# Patient Record
Sex: MALE | Race: BLACK OR AFRICAN AMERICAN | NOT HISPANIC OR LATINO | URBAN - METROPOLITAN AREA
[De-identification: names, ages, dates, MRNs, and addresses within clinical notes are randomized per-mention and may not be internally consistent; named-entity substitution may affect disease eponyms.]

---

## 2022-08-23 ENCOUNTER — OFFICE VISIT (OUTPATIENT)
Dept: NEUROLOGY | Facility: CLINIC | Age: 27
End: 2022-08-23

## 2022-08-23 VITALS
DIASTOLIC BLOOD PRESSURE: 65 MMHG | HEIGHT: 74 IN | BODY MASS INDEX: 29.65 KG/M2 | HEART RATE: 53 BPM | OXYGEN SATURATION: 97 % | WEIGHT: 231 LBS | SYSTOLIC BLOOD PRESSURE: 108 MMHG

## 2022-08-23 DIAGNOSIS — S06.0X0A CONCUSSION WITHOUT LOSS OF CONSCIOUSNESS, INITIAL ENCOUNTER: Primary | ICD-10-CM

## 2022-08-23 PROCEDURE — 99203 OFFICE O/P NEW LOW 30 MIN: CPT | Performed by: STUDENT IN AN ORGANIZED HEALTH CARE EDUCATION/TRAINING PROGRAM

## 2022-08-23 NOTE — PATIENT INSTRUCTIONS
I would classify this as a concussion without loss of consciousness   I would recommend brain rest, more sleep, dark quiet room if needed during the day.  Gradual return to activity per protocol---ImPACT testing, neuropsychological testing.   The biggest barrier for you will be head/eye movement provoking symptoms (nausea or dizziness).  Listen to your body, a little more time could prevent recurrence and let you not have symptoms throughout the season.    Myself or my colleagues will re evaluate you after the above testing.

## 2022-08-23 NOTE — NURSING NOTE
"Zeb Rodriguez is a 27 year old male who presents for:  Chief Complaint   Patient presents with     Consult     Concussion evaluation        Initial Vitals:  /65   Pulse 53   Ht 1.88 m (6' 2\")   Wt 104.8 kg (231 lb)   SpO2 97%   BMI 29.66 kg/m   Estimated body mass index is 29.66 kg/m  as calculated from the following:    Height as of this encounter: 1.88 m (6' 2\").    Weight as of this encounter: 104.8 kg (231 lb).. Body surface area is 2.34 meters squared. BP completed using cuff size: sasha Glass    "

## 2022-08-23 NOTE — PROGRESS NOTES
[facility]/[place]  Section of General Neurology  New Patient Visit      Zeb Rodriguez MRN# [id number]   Age: 27 year old YOB: 1995     Requesting physician: No ref. provider found  No primary care provider on file.     Reason for Consultation: Post concussive symptoms           Assessment and Plan:     Zeb Rodriguez is a very pleasant 27 year old man who presents in evaluation.  He had a few instances in a recent preseason game where he felt he took and/or delivered some particularly hard hits.  Particularly after he sacked the quarterback did he note some brief blurred vision and dizziness.  He has had some continued non specific neurological issues subsequently.  He is improving and has less headaches today and no visual issues but intermittent nausea and dizziness remain limiting.    Overall we discussed this does meet criteria for concussion without loss of consciousness and should be treated as such in my opinion.        Recommended prioritizing hydration, sleep, may need more time in dark/quiet room/brain rest to facilitate healing.   He had a similar concussion in college and he felt it took him about 1 week to recover.  We discussed it is difficult to ascertain exact timing but similar timing would be a reasonable assumption given the symptoms he is experiencing and interval improvement currently.   Biggest limitation could prove to be head movement provoking dizziness going forward.    Neurological examination was intact though rapid eye movements did provoke this dizziness.   Discussed we can consider anti emetic medications like zofran or headache medications like gabapentin but ultimately time and rest would be best and he would prefer no medications which is quite reasonable.      Overall recommended he follow the [employer] protocol in this regard and after ImPACT testing and Neuropsychological testing myself or one of my colleagues can review this data regarding timing of  "resuming normal football activities.           Harry Ochoa MD   of Neurology   [facility]      History of Presenting Symptoms:   Zeb Rodriguez is a 27 year old male who presents today for evaluation of post concussion symptoms    Nature of injury:  Had a QB sack and had neck issues prior to that too.    Busted his chin open too.   Being a passenger later he felt nauseous, dizzy.   Fell asleep fine.   Stiff neck and treatment.  MRI was clear.    Nausea from lift the next day.   Head movement still made him a little nauseous.    Feeling a little better today.   Had a concussion in college.  Similar.     LOC: none  Current symptoms:  Headache --minimal  Dizziness-- remains limiting  Sensitivity to light/sound--to light at times  ImPACT testing --pending  Neuropsych testing--pending    He does not feel like 100% just yet.  Still has some brain fog.      Common return to play sequence discussed:  Light aerobic exercise (stationary bike)  Sport specific exercise (movement/balance but not dedicated drills)  Non contact drills   Contact drills --after medical clearance  Full return to play    He will follow the [facility] protocols.          Allergies:   No Known Allergies     Review of Systems:   As noted above     Physical Exam:   Vitals: /65   Pulse 53   Ht 1.88 m (6' 2\")   Wt 104.8 kg (231 lb)   SpO2 97%   BMI 29.66 kg/m       Neuro:   General Appearance: No apparent distress, well-nourished, well-groomed, pleasant     Mental Status: Alert and oriented to person, place, and time. Speech fluent and comprehension intact. No dysarthria.    Cranial Nerves:   II: Visual fields: normal  III: Pupils: 3 mm, equal, round, reactive to light   III,IV,VI: Extraocular Movements: intact --rapid eye movement/head movement side to side still provokes dizziness easily.    V: Facial sensation: intact to light touch  VII: Facial strength: intact without asymmetry  VIII: Hearing: intact " grossly  IX: Palate: intact   XI: Shoulder shrug: intact  XII: Tongue movement: normal     Motor Exam:   Upper Extremities  Deltoid  Bicep  Tricep  Wrist Extensors  strength Intrinsic Muscles    Right  5  5  5  5 5 5    Left  5  5  5  5 5 5      Lower Extremities  Hip Flexors  Knee Extensors  Knee   Flexors  Dorsi Flexion  Plantar   Flexion    Right  5  5  5  5  5    Left  5  5  5  5  5        No drift is present. No abnormal movements. Tone is normal throughout.    Sensory: intact to light touch, vibration    Coordination: no dysmetria with finger-to-nose bilaterally    Reflexes: biceps, triceps, brachioradialis, patellar, and ankle jerks 2+ and symmetric.     Gait: normal casual gait, normal stride length. Tandem gait intact            The total time of this encounter today amounted to 40 minutes. This time included time spent with the patient, prep work, ordering tests, and performing post visit documentation.

## 2022-08-29 ENCOUNTER — VIRTUAL VISIT (OUTPATIENT)
Dept: NEUROLOGY | Facility: CLINIC | Age: 27
End: 2022-08-29

## 2022-08-29 VITALS — WEIGHT: 231 LBS | HEIGHT: 74 IN | BODY MASS INDEX: 29.65 KG/M2

## 2022-08-29 DIAGNOSIS — S06.0X0D CONCUSSION WITHOUT LOSS OF CONSCIOUSNESS, SUBSEQUENT ENCOUNTER: Primary | ICD-10-CM

## 2022-08-29 PROCEDURE — 99213 OFFICE O/P EST LOW 20 MIN: CPT | Mod: 95 | Performed by: STUDENT IN AN ORGANIZED HEALTH CARE EDUCATION/TRAINING PROGRAM

## 2022-08-29 NOTE — PROGRESS NOTES
[facility]/[place]  Section of General Neurology  Return Patient  Virtual Visit    Zeb Rodriguez MRN# [id number]   Age: 27 year old YOB: 1995     Brief history of symptoms: The patient was initially seen in neurologic consultation on 8/23/22 for evaluation of concussion. Please see the comprehensive neurologic consultation note from that date in the Epic records for details.          Assessment and Plan:   Zeb Rodriguez is a very pleasant 27 year old man seen in follow up for concussion.  He is feeling much better/back to his baseline.  Specifically he endorses no further headache, dizziness.  He can move his eyes rapidly now without symptoms.   I reviewed his neuropsychological testing during which he performed well.  Given this progress, meeting neuropsychological criteria, and normal in person neurological exam after the event previously I do think neurologically he should be able start to gradually return to play per protocol and he does not require further neurological testing at this time.  Discussed what this commonly entails, starting with non contact and building up from there as tolerated.  I am hopeful he will do quite well in this regard.  All questions answered.   Thank you for allowing me to participate in Mr. Rodriguez's care.  Please reach out with any questions.          Harry Ochoa MD   of Neurology   [facility]      Interval history:   Symptoms much improved  No further headaches  No dizziness  Can scan a room now with ease  He feels back to his baseline  Neuropsych report discussed.        Allergies:   No Known Allergies       Physical Exam:   General: Seated comfortably in no acute distress.  Neurologic:     Mental Status: Fully alert, attentive and oriented. Speech clear and fluent, no paraphasic errors.     Cranial Nerves: Facial movements symmetric. Hearing not formally tested but intact to conversation.  No  dysarthria.     Motor: No tremors or other abnormal movements observed.               The total time of this encounter today amounted to 12 minutes of time on video visit and 25 minutes in total. This time included time spent with the patient, prep work, ordering tests, reviewing testing, and performing post visit documentation.

## 2022-08-29 NOTE — NURSING NOTE
"Zeb Rodriguez is a 27 year old male who presents for:  No chief complaint on file.       Initial Vitals:  Ht 1.88 m (6' 2\")   Wt 104.8 kg (231 lb)   BMI 29.66 kg/m   Estimated body mass index is 29.66 kg/m  as calculated from the following:    Height as of this encounter: 1.88 m (6' 2\").    Weight as of this encounter: 104.8 kg (231 lb).. Body surface area is 2.34 meters squared. BP completed using cuff size: sasha Glass    "

## 2022-08-29 NOTE — NURSING NOTE
"Zeb Rodriguez is a 27 year old male who presents for:  No chief complaint on file.       Initial Vitals:  Ht 6' 2\" (1.88 m)   Wt 231 lb (104.8 kg)   BMI 29.66 kg/m   Estimated body mass index is 29.66 kg/m  as calculated from the following:    Height as of this encounter: 6' 2\" (1.88 m).    Weight as of this encounter: 231 lb (104.8 kg).. Body surface area is 2.34 meters squared. BP completed using cuff size: NA (Not Taken)        Bushra Glass    "

## 2022-08-29 NOTE — NURSING NOTE
"Zeb Rodriguez is a 27 year old male who presents for:  Chief Complaint   Patient presents with     Follow Up     Concussion review          Initial Vitals:  Ht 1.88 m (6' 2\")   Wt 104.8 kg (231 lb)   BMI 29.66 kg/m   Estimated body mass index is 29.66 kg/m  as calculated from the following:    Height as of this encounter: 1.88 m (6' 2\").    Weight as of this encounter: 104.8 kg (231 lb).. Body surface area is 2.34 meters squared. BP completed using cuff size: NA (Not Taken)        Bushra Glass    "

## 2022-10-03 ENCOUNTER — HEALTH MAINTENANCE LETTER (OUTPATIENT)
Age: 27
End: 2022-10-03

## 2023-10-22 ENCOUNTER — HEALTH MAINTENANCE LETTER (OUTPATIENT)
Age: 28
End: 2023-10-22

## 2024-12-15 ENCOUNTER — HEALTH MAINTENANCE LETTER (OUTPATIENT)
Age: 29
End: 2024-12-15